# Patient Record
Sex: FEMALE | Race: WHITE | Employment: UNEMPLOYED | URBAN - METROPOLITAN AREA
[De-identification: names, ages, dates, MRNs, and addresses within clinical notes are randomized per-mention and may not be internally consistent; named-entity substitution may affect disease eponyms.]

---

## 2023-05-15 ENCOUNTER — HOSPITAL ENCOUNTER (OUTPATIENT)
Facility: HOSPITAL | Age: 53
Discharge: HOME | End: 2023-05-18
Payer: MEDICAID

## 2023-05-15 DIAGNOSIS — M65.311 TRIGGER THUMB OF RIGHT HAND: ICD-10-CM

## 2023-05-15 LAB
EKG ATRIAL RATE: 86 BPM
EKG DIAGNOSIS: NORMAL
EKG P AXIS: 75 DEGREES
EKG P-R INTERVAL: 150 MS
EKG Q-T INTERVAL: 362 MS
EKG QRS DURATION: 84 MS
EKG QTC CALCULATION (BAZETT): 433 MS
EKG R AXIS: 47 DEGREES
EKG T AXIS: 55 DEGREES
EKG VENTRICULAR RATE: 86 BPM

## 2023-05-15 PROCEDURE — 93010 ELECTROCARDIOGRAM REPORT: CPT | Performed by: INTERNAL MEDICINE

## 2023-05-15 PROCEDURE — 93005 ELECTROCARDIOGRAM TRACING: CPT

## 2023-05-19 PROBLEM — M65.311 TRIGGER FINGER OF RIGHT THUMB: Chronic | Status: ACTIVE | Noted: 2023-05-19

## 2023-05-23 ENCOUNTER — ANESTHESIA EVENT (OUTPATIENT)
Facility: HOSPITAL | Age: 53
End: 2023-05-23
Payer: MEDICAID

## 2023-05-25 ENCOUNTER — HOSPITAL ENCOUNTER (OUTPATIENT)
Facility: HOSPITAL | Age: 53
Setting detail: OUTPATIENT SURGERY
Discharge: HOME | End: 2023-05-25
Attending: ORTHOPAEDIC SURGERY | Admitting: ORTHOPAEDIC SURGERY
Payer: MEDICAID

## 2023-05-25 ENCOUNTER — ANESTHESIA (OUTPATIENT)
Facility: HOSPITAL | Age: 53
End: 2023-05-25
Payer: MEDICAID

## 2023-05-25 VITALS
HEART RATE: 90 BPM | SYSTOLIC BLOOD PRESSURE: 138 MMHG | HEIGHT: 62 IN | WEIGHT: 198.7 LBS | OXYGEN SATURATION: 95 % | RESPIRATION RATE: 16 BRPM | BODY MASS INDEX: 36.57 KG/M2 | TEMPERATURE: 97.2 F | DIASTOLIC BLOOD PRESSURE: 76 MMHG

## 2023-05-25 PROBLEM — M65.311 TRIGGER FINGER OF RIGHT THUMB: Chronic | Status: RESOLVED | Noted: 2023-05-19 | Resolved: 2023-05-25

## 2023-05-25 LAB
GLUCOSE BLD STRIP.AUTO-MCNC: 99 MG/DL (ref 70–110)
HCG UR QL: NEGATIVE

## 2023-05-25 PROCEDURE — 3700000001 HC ADD 15 MINUTES (ANESTHESIA): Performed by: ORTHOPAEDIC SURGERY

## 2023-05-25 PROCEDURE — 81025 URINE PREGNANCY TEST: CPT

## 2023-05-25 PROCEDURE — 3600000002 HC SURGERY LEVEL 2 BASE: Performed by: ORTHOPAEDIC SURGERY

## 2023-05-25 PROCEDURE — 3700000000 HC ANESTHESIA ATTENDED CARE: Performed by: ORTHOPAEDIC SURGERY

## 2023-05-25 PROCEDURE — 2500000003 HC RX 250 WO HCPCS: Performed by: ORTHOPAEDIC SURGERY

## 2023-05-25 PROCEDURE — 2500000003 HC RX 250 WO HCPCS: Performed by: NURSE ANESTHETIST, CERTIFIED REGISTERED

## 2023-05-25 PROCEDURE — 2709999900 HC NON-CHARGEABLE SUPPLY: Performed by: ORTHOPAEDIC SURGERY

## 2023-05-25 PROCEDURE — 7100000010 HC PHASE II RECOVERY - FIRST 15 MIN: Performed by: ORTHOPAEDIC SURGERY

## 2023-05-25 PROCEDURE — 7100000011 HC PHASE II RECOVERY - ADDTL 15 MIN: Performed by: ORTHOPAEDIC SURGERY

## 2023-05-25 PROCEDURE — 6360000002 HC RX W HCPCS: Performed by: NURSE ANESTHETIST, CERTIFIED REGISTERED

## 2023-05-25 PROCEDURE — 2580000003 HC RX 258: Performed by: ORTHOPAEDIC SURGERY

## 2023-05-25 PROCEDURE — 82962 GLUCOSE BLOOD TEST: CPT

## 2023-05-25 PROCEDURE — 3600000012 HC SURGERY LEVEL 2 ADDTL 15MIN: Performed by: ORTHOPAEDIC SURGERY

## 2023-05-25 RX ORDER — LIDOCAINE HYDROCHLORIDE 20 MG/ML
INJECTION, SOLUTION EPIDURAL; INFILTRATION; INTRACAUDAL; PERINEURAL PRN
Status: DISCONTINUED | OUTPATIENT
Start: 2023-05-25 | End: 2023-05-25 | Stop reason: SDUPTHER

## 2023-05-25 RX ORDER — MIDAZOLAM HYDROCHLORIDE 1 MG/ML
INJECTION INTRAMUSCULAR; INTRAVENOUS PRN
Status: DISCONTINUED | OUTPATIENT
Start: 2023-05-25 | End: 2023-05-25 | Stop reason: SDUPTHER

## 2023-05-25 RX ORDER — SODIUM CHLORIDE, SODIUM LACTATE, POTASSIUM CHLORIDE, CALCIUM CHLORIDE 600; 310; 30; 20 MG/100ML; MG/100ML; MG/100ML; MG/100ML
INJECTION, SOLUTION INTRAVENOUS CONTINUOUS
Status: DISCONTINUED | OUTPATIENT
Start: 2023-05-25 | End: 2023-05-25 | Stop reason: HOSPADM

## 2023-05-25 RX ORDER — SODIUM CHLORIDE 0.9 % (FLUSH) 0.9 %
5-40 SYRINGE (ML) INJECTION PRN
Status: DISCONTINUED | OUTPATIENT
Start: 2023-05-25 | End: 2023-05-25 | Stop reason: HOSPADM

## 2023-05-25 RX ORDER — FENTANYL CITRATE 50 UG/ML
INJECTION, SOLUTION INTRAMUSCULAR; INTRAVENOUS PRN
Status: DISCONTINUED | OUTPATIENT
Start: 2023-05-25 | End: 2023-05-25 | Stop reason: SDUPTHER

## 2023-05-25 RX ORDER — BUPIVACAINE HYDROCHLORIDE 2.5 MG/ML
INJECTION, SOLUTION EPIDURAL; INFILTRATION; INTRACAUDAL PRN
Status: DISCONTINUED | OUTPATIENT
Start: 2023-05-25 | End: 2023-05-25 | Stop reason: ALTCHOICE

## 2023-05-25 RX ORDER — ONDANSETRON 2 MG/ML
INJECTION INTRAMUSCULAR; INTRAVENOUS PRN
Status: DISCONTINUED | OUTPATIENT
Start: 2023-05-25 | End: 2023-05-25 | Stop reason: SDUPTHER

## 2023-05-25 RX ORDER — SODIUM CHLORIDE 0.9 % (FLUSH) 0.9 %
5-40 SYRINGE (ML) INJECTION EVERY 12 HOURS SCHEDULED
Status: DISCONTINUED | OUTPATIENT
Start: 2023-05-25 | End: 2023-05-25 | Stop reason: HOSPADM

## 2023-05-25 RX ORDER — SODIUM CHLORIDE 9 MG/ML
INJECTION, SOLUTION INTRAVENOUS PRN
Status: DISCONTINUED | OUTPATIENT
Start: 2023-05-25 | End: 2023-05-25 | Stop reason: HOSPADM

## 2023-05-25 RX ADMIN — LIDOCAINE HYDROCHLORIDE 60 MG: 20 INJECTION, SOLUTION EPIDURAL; INFILTRATION; INTRACAUDAL; PERINEURAL at 15:25

## 2023-05-25 RX ADMIN — SODIUM CHLORIDE, POTASSIUM CHLORIDE, SODIUM LACTATE AND CALCIUM CHLORIDE: 600; 310; 30; 20 INJECTION, SOLUTION INTRAVENOUS at 13:37

## 2023-05-25 RX ADMIN — MIDAZOLAM 2 MG: 1 INJECTION INTRAMUSCULAR; INTRAVENOUS at 15:19

## 2023-05-25 RX ADMIN — ONDANSETRON HYDROCHLORIDE 4 MG: 2 INJECTION INTRAMUSCULAR; INTRAVENOUS at 15:25

## 2023-05-25 RX ADMIN — FENTANYL CITRATE 50 MCG: 50 INJECTION, SOLUTION INTRAMUSCULAR; INTRAVENOUS at 15:25

## 2023-05-25 ASSESSMENT — PAIN - FUNCTIONAL ASSESSMENT
PAIN_FUNCTIONAL_ASSESSMENT: 0-10
PAIN_FUNCTIONAL_ASSESSMENT: 0-10

## 2023-05-25 ASSESSMENT — PAIN DESCRIPTION - DESCRIPTORS: DESCRIPTORS: ACHING;SHARP

## 2023-05-25 ASSESSMENT — PAIN SCALES - GENERAL
PAINLEVEL_OUTOF10: 8
PAINLEVEL_OUTOF10: 0
PAINLEVEL_OUTOF10: 0

## 2023-05-25 NOTE — INTERVAL H&P NOTE
Update History & Physical    The patient's History and Physical of May 19, the surgery was reviewed with the patient and I examined the patient. There was no change. The surgical site was confirmed by the patient and me. Plan: The risks, benefits, expected outcome, and alternative to the recommended procedure have been discussed with the patient. Patient understands and wants to proceed with the procedure.      Electronically signed by Oni Dawkins MD on 5/25/2023 at 2:29 PM

## 2023-05-25 NOTE — DISCHARGE INSTRUCTIONS
Vane Short III, MD Norvin Motts, PA-C    Upper Extremity Surgery  Discharge Instructions      Please take the time to review the following instructions before you leave the hospital and use them as guidelines during your recovery from surgery. If you have any questions you may contact my office at [phone]. Wound Care/Dressing Changes:    []   You may remove the bulky dressing two days after surgery. Once you remove this, no dressing is necessary if there is no drainage. [x]   You may change your dressing as needed. Beginning the 2 days after you are discharged from the hospital you should change your dressing daily. A big, bulky dressing isnt necessary as long as there is any drainage from the incisions. You can put a band-aid or a piece of gauze over each incision and wear an ACE bandage as needed for comfort and swelling. []   Dont remove your dressing or get them wet. It isnt necessary to apply antibiotic ointment to your incisions. Sutures will be removed at your one week post-op visit. Staples (if you have them) are removed in two weeks. If you have steri-strips over your incision they will start to peel off in 7-10 days as you get them wet. They dont need to be removed prior to that. When they begin to peel off, you may remove them. They should all be removed by 14 days from your surgery. Showering/Bathing:    [x]   You may shower 2 days after your surgery. Your dressing may be removed for showering. You may get your incisions wet in the shower. Dont vigorously scrub your incisions. Apply a clean, dry dressing after you have dried your incisions. Do not take a bath or get into a swimming pool or Jacuzzi until the incisions are completely healed. This may take about 14 days. Do not soak your incision under water. Sling:    [x]   You are not required to wear your sling and should do so only as needed for comfort.  You have no restrictions with regards

## 2023-05-25 NOTE — ANESTHESIA PRE PROCEDURE
Department of Anesthesiology  Preprocedure Note       Name:  Roxy Hytat   Age:  45 y.o.  :  1970                                          MRN:  [id number]         Date:  2023      Surgeon: Slick Plummer):  Lorrie Torres MD    Procedure: Procedure(s):  RIGHT 1ST TRIGGER FINGER RELEASE    Medications prior to admission:   Prior to Admission medications    Medication Sig Start Date End Date Taking? Authorizing Provider   gabapentin (NEURONTIN) 300 MG capsule Take 1 capsule by mouth in the morning and at bedtime.  Indications: Leg Cramps    Historical Provider, MD   DULoxetine (CYMBALTA) 60 MG extended release capsule Take 1 capsule by mouth daily    Historical Provider, MD   acetaminophen (TYLENOL) 500 MG tablet Take 1 tablet by mouth every 6 hours as needed for Pain    Historical Provider, MD       Current medications:    Current Facility-Administered Medications   Medication Dose Route Frequency Provider Last Rate Last Admin    sodium chloride flush 0.9 % injection 5-40 mL  5-40 mL IntraVENous 2 times per day Darryl Nazario PA-C        sodium chloride flush 0.9 % injection 5-40 mL  5-40 mL IntraVENous PRN Milagro Plana Schwizer, PA-C        0.9 % sodium chloride infusion   IntraVENous PRN Milagro Plana Schwizer, PA-C        lactated ringers IV soln infusion   IntraVENous Continuous Fili Flores III,  mL/hr at 23 1337 New Bag at 23 1337       Allergies:  No Known Allergies    Problem List:    Patient Active Problem List   Diagnosis Code    Trigger finger of right thumb M65.311       Past Medical History:        Diagnosis Date    Arthritis     Depression     Exercise tolerance finding 05/15/2023    Able to go up and down two flights of stairs without SOB or chest pain    Fibromyalgia     back, shoulder, arms    VÍCTOR (generalized anxiety disorder)     Leg cramps     Ruptured lumbar disc     received epidural Jhoana       Past Surgical History:        Procedure

## 2023-05-25 NOTE — ANESTHESIA POSTPROCEDURE EVALUATION
Department of Anesthesiology  Postprocedure Note    Patient: Avni Harman  MRN: [id number]  YOB: 1970  Date of evaluation: 5/25/2023      Procedure Summary     Date: 05/25/23 Room / Location: [facility] / [facility] MAIN OR    Anesthesia Start: 1519 Anesthesia Stop: 0902    Procedure: RIGHT 1ST TRIGGER FINGER RELEASE (Right: Fingers) Diagnosis:       Trigger thumb of right hand      (RIGHT FIRST TRIGGER FINGER)    Surgeons: Charles Argueta MD Responsible Provider: Pat Medel MD    Anesthesia Type: MAC ASA Status: 1          Anesthesia Type: MAC    Darien Phase I:      Darien Phase II: Darien Score: 10      Anesthesia Post Evaluation    Level of consciousness: awake  Airway patency: patent  Nausea & Vomiting: no nausea and no vomiting  Complications: no  Cardiovascular status: hemodynamically stable  Respiratory status: acceptable and spontaneous ventilation  Hydration status: stable

## 2023-05-25 NOTE — PERIOP NOTE
TRANSFER - IN REPORT:    Verbal report received from [facility] on [address] [place] [address]  being received from OR for routine post-op      Report consisted of patient's Situation, Background, Assessment and   Recommendations(SBAR). Information from the following report(s) Nurse Handoff Report, Adult Overview, Surgery Report, Intake/Output, and MAR was reviewed with the receiving nurse. Opportunity for questions and clarification was provided. Assessment completed upon patient's arrival to unit and care assumed.

## 2023-05-25 NOTE — OP NOTE
and the patient returned to the recovery room awake, in stable condition. All instrument, sponge and needle counts were correct. This was the same procedure for each trigger finger as consented.             [id number] Ledy Cantrell MD  5/25/2023  3:35 PM

## 2023-05-25 NOTE — PERIOP NOTE
Pt. Used restroom in pre-op area with assistance. Patient placed on Moises Paws for a minimum of 30 min in  Preop.

## 2023-05-26 NOTE — ANESTHESIA PRE PROCEDURE
Department of Anesthesiology  Preprocedure Note       Name:  Jonathan Melendez   Age:  45 y.o.  :  1970                                          MRN:  [id number]         Date:  2023      Surgeon: Farhad Nicolas):  John Morgan MD    Procedure: Procedure(s):  RIGHT 1ST TRIGGER FINGER RELEASE    Medications prior to admission:   Prior to Admission medications    Medication Sig Start Date End Date Taking? Authorizing Provider   gabapentin (NEURONTIN) 300 MG capsule Take 1 capsule by mouth in the morning and at bedtime. Indications: Leg Cramps    Historical Provider, MD   DULoxetine (CYMBALTA) 60 MG extended release capsule Take 1 capsule by mouth daily    Historical Provider, MD   acetaminophen (TYLENOL) 500 MG tablet Take 1 tablet by mouth every 6 hours as needed for Pain    Historical Provider, MD       Current medications:    Current Outpatient Medications   Medication Sig Dispense Refill    gabapentin (NEURONTIN) 300 MG capsule Take 1 capsule by mouth in the morning and at bedtime. Indications: Leg Cramps      DULoxetine (CYMBALTA) 60 MG extended release capsule Take 1 capsule by mouth daily      acetaminophen (TYLENOL) 500 MG tablet Take 1 tablet by mouth every 6 hours as needed for Pain       No current facility-administered medications for this visit.        Allergies:  No Known Allergies    Problem List:    Patient Active Problem List   Diagnosis Code   (none) - all problems resolved or deleted       Past Medical History:        Diagnosis Date    Arthritis     Depression     Exercise tolerance finding 05/15/2023    Able to go up and down two flights of stairs without SOB or chest pain    Fibromyalgia     back, shoulder, arms    VÍCTOR (generalized anxiety disorder)     Leg cramps     Ruptured lumbar disc     received epidural Jhoana       Past Surgical History:        Procedure Laterality Date    CERVICAL FUSION      KNEE SURGERY Left     TONSILLECTOMY         Social History:    Social

## (undated) DEVICE — GLOVE SURG SZ 85 L12IN THK75MIL DK GRN LTX FREE

## (undated) DEVICE — SUTURE MCRYL SZ 3-0 L27IN ABSRB UD PS-2 3/8 CIR REV CUT NDL MCP427H

## (undated) DEVICE — MASTISOL ADHESIVE LIQ 2/3ML

## (undated) DEVICE — NEEDLE 25GA X 1.5 IN ECLIPSE

## (undated) DEVICE — STRIP,CLOSURE,WOUND,MEDI-STRIP,1/2X4: Brand: MEDLINE

## (undated) DEVICE — BANDAGE COMPR W3INXL5YD BGE POLY COT E RECOVERABLE BRTH W/

## (undated) DEVICE — GLOVE ORANGE PI 8   MSG9080

## (undated) DEVICE — PACK PROCEDURE SURG EXTREMITY CUST

## (undated) DEVICE — PADDING,UNDERCAST,COTTON, 3X4YD STERILE: Brand: MEDLINE

## (undated) DEVICE — GLOVE SURG SZ 8 L12IN THK75MIL DK GRN LTX FREE

## (undated) DEVICE — GLOVE SURG SZ 75 L12IN FNGR THK79MIL GRN LTX FREE

## (undated) DEVICE — BANDAGE,ELASTIC,ESMARK,STERILE,4"X9',LF: Brand: MEDLINE

## (undated) DEVICE — GARMENT,MEDLINE,DVT,INT,CALF,MED, GEN2: Brand: MEDLINE

## (undated) DEVICE — T-DRAPE,EXTREMITY,STERILE: Brand: MEDLINE

## (undated) DEVICE — APPLICATOR MEDICATED 26 CC SOLUTION HI LT ORNG CHLORAPREP